# Patient Record
Sex: FEMALE | Race: OTHER | NOT HISPANIC OR LATINO | URBAN - METROPOLITAN AREA
[De-identification: names, ages, dates, MRNs, and addresses within clinical notes are randomized per-mention and may not be internally consistent; named-entity substitution may affect disease eponyms.]

---

## 2019-03-25 ENCOUNTER — INPATIENT (INPATIENT)
Facility: HOSPITAL | Age: 64
LOS: 1 days | Discharge: HOME | DRG: 392 | End: 2019-03-27
Attending: INTERNAL MEDICINE | Admitting: HOSPITALIST
Payer: MEDICARE

## 2019-03-25 VITALS
OXYGEN SATURATION: 99 % | SYSTOLIC BLOOD PRESSURE: 126 MMHG | HEIGHT: 64 IN | TEMPERATURE: 98 F | HEART RATE: 78 BPM | RESPIRATION RATE: 18 BRPM | WEIGHT: 220.02 LBS | DIASTOLIC BLOOD PRESSURE: 87 MMHG

## 2019-03-25 PROCEDURE — 99285 EMERGENCY DEPT VISIT HI MDM: CPT | Mod: 25

## 2019-03-25 PROCEDURE — 93010 ELECTROCARDIOGRAM REPORT: CPT

## 2019-03-26 DIAGNOSIS — R10.13 EPIGASTRIC PAIN: ICD-10-CM

## 2019-03-26 DIAGNOSIS — R94.31 ABNORMAL ELECTROCARDIOGRAM [ECG] [EKG]: ICD-10-CM

## 2019-03-26 DIAGNOSIS — J90 PLEURAL EFFUSION, NOT ELSEWHERE CLASSIFIED: ICD-10-CM

## 2019-03-26 DIAGNOSIS — I10 ESSENTIAL (PRIMARY) HYPERTENSION: ICD-10-CM

## 2019-03-26 LAB
ALBUMIN SERPL ELPH-MCNC: 4.3 G/DL — SIGNIFICANT CHANGE UP (ref 3.3–5)
ALP SERPL-CCNC: 96 U/L — SIGNIFICANT CHANGE UP (ref 40–120)
ALT FLD-CCNC: 43 U/L — SIGNIFICANT CHANGE UP (ref 10–45)
ANION GAP SERPL CALC-SCNC: 17 MMOL/L — SIGNIFICANT CHANGE UP (ref 5–17)
APPEARANCE UR: CLEAR — SIGNIFICANT CHANGE UP
APTT BLD: 29.3 SEC — SIGNIFICANT CHANGE UP (ref 27.5–36.3)
AST SERPL-CCNC: 37 U/L — SIGNIFICANT CHANGE UP (ref 10–40)
BACTERIA # UR AUTO: ABNORMAL
BILIRUB SERPL-MCNC: 1 MG/DL — SIGNIFICANT CHANGE UP (ref 0.2–1.2)
BILIRUB UR-MCNC: NEGATIVE — SIGNIFICANT CHANGE UP
BUN SERPL-MCNC: 10 MG/DL — SIGNIFICANT CHANGE UP (ref 7–23)
CALCIUM SERPL-MCNC: 10.6 MG/DL — HIGH (ref 8.4–10.5)
CHLORIDE SERPL-SCNC: 99 MMOL/L — SIGNIFICANT CHANGE UP (ref 96–108)
CO2 SERPL-SCNC: 22 MMOL/L — SIGNIFICANT CHANGE UP (ref 22–31)
COLOR SPEC: SIGNIFICANT CHANGE UP
CREAT SERPL-MCNC: 0.57 MG/DL — SIGNIFICANT CHANGE UP (ref 0.5–1.3)
DIFF PNL FLD: NEGATIVE — SIGNIFICANT CHANGE UP
EPI CELLS # UR: 1 /HPF — SIGNIFICANT CHANGE UP
GAS PNL BLDV: SIGNIFICANT CHANGE UP
GAS PNL BLDV: SIGNIFICANT CHANGE UP
GLUCOSE BLDC GLUCOMTR-MCNC: 157 MG/DL — HIGH (ref 70–99)
GLUCOSE BLDC GLUCOMTR-MCNC: 192 MG/DL — HIGH (ref 70–99)
GLUCOSE BLDC GLUCOMTR-MCNC: 210 MG/DL — HIGH (ref 70–99)
GLUCOSE SERPL-MCNC: 159 MG/DL — HIGH (ref 70–99)
GLUCOSE UR QL: NEGATIVE — SIGNIFICANT CHANGE UP
HCT VFR BLD CALC: 43.7 % — SIGNIFICANT CHANGE UP (ref 34.5–45)
HGB BLD-MCNC: 14.5 G/DL — SIGNIFICANT CHANGE UP (ref 11.5–15.5)
HYALINE CASTS # UR AUTO: 0 /LPF — SIGNIFICANT CHANGE UP (ref 0–2)
INR BLD: 1.04 RATIO — SIGNIFICANT CHANGE UP (ref 0.88–1.16)
INR BLD: 1.14 RATIO — SIGNIFICANT CHANGE UP (ref 0.88–1.16)
KETONES UR-MCNC: NEGATIVE — SIGNIFICANT CHANGE UP
LEUKOCYTE ESTERASE UR-ACNC: NEGATIVE — SIGNIFICANT CHANGE UP
LIDOCAIN IGE QN: 35 U/L — SIGNIFICANT CHANGE UP (ref 7–60)
MCHC RBC-ENTMCNC: 28.4 PG — SIGNIFICANT CHANGE UP (ref 27–34)
MCHC RBC-ENTMCNC: 33.1 GM/DL — SIGNIFICANT CHANGE UP (ref 32–36)
MCV RBC AUTO: 85.6 FL — SIGNIFICANT CHANGE UP (ref 80–100)
NITRITE UR-MCNC: POSITIVE
PH UR: 5 — SIGNIFICANT CHANGE UP (ref 5–8)
PLATELET # BLD AUTO: 215 K/UL — SIGNIFICANT CHANGE UP (ref 150–400)
POTASSIUM SERPL-MCNC: 4.9 MMOL/L — SIGNIFICANT CHANGE UP (ref 3.5–5.3)
POTASSIUM SERPL-SCNC: 4.9 MMOL/L — SIGNIFICANT CHANGE UP (ref 3.5–5.3)
PROT SERPL-MCNC: 7.6 G/DL — SIGNIFICANT CHANGE UP (ref 6–8.3)
PROT UR-MCNC: NEGATIVE — SIGNIFICANT CHANGE UP
PROTHROM AB SERPL-ACNC: 11.9 SEC — SIGNIFICANT CHANGE UP (ref 10–12.9)
PROTHROM AB SERPL-ACNC: 13 SEC — HIGH (ref 10–12.9)
RBC # BLD: 5.11 M/UL — SIGNIFICANT CHANGE UP (ref 3.8–5.2)
RBC # FLD: 12.5 % — SIGNIFICANT CHANGE UP (ref 10.3–14.5)
RBC CASTS # UR COMP ASSIST: 2 /HPF — SIGNIFICANT CHANGE UP (ref 0–4)
SODIUM SERPL-SCNC: 138 MMOL/L — SIGNIFICANT CHANGE UP (ref 135–145)
SP GR SPEC: 1 — LOW (ref 1.01–1.02)
TROPONIN T, HIGH SENSITIVITY RESULT: 23 NG/L — SIGNIFICANT CHANGE UP (ref 0–51)
TROPONIN T, HIGH SENSITIVITY RESULT: 23 NG/L — SIGNIFICANT CHANGE UP (ref 0–51)
UROBILINOGEN FLD QL: NEGATIVE — SIGNIFICANT CHANGE UP
WBC # BLD: 10.4 K/UL — SIGNIFICANT CHANGE UP (ref 3.8–10.5)
WBC # FLD AUTO: 10.4 K/UL — SIGNIFICANT CHANGE UP (ref 3.8–10.5)
WBC UR QL: 2 /HPF — SIGNIFICANT CHANGE UP (ref 0–5)

## 2019-03-26 PROCEDURE — 93306 TTE W/DOPPLER COMPLETE: CPT | Mod: 26

## 2019-03-26 PROCEDURE — 74177 CT ABD & PELVIS W/CONTRAST: CPT | Mod: 26

## 2019-03-26 PROCEDURE — 71046 X-RAY EXAM CHEST 2 VIEWS: CPT | Mod: 26

## 2019-03-26 RX ORDER — SODIUM CHLORIDE 9 MG/ML
1000 INJECTION, SOLUTION INTRAVENOUS
Qty: 0 | Refills: 0 | Status: DISCONTINUED | OUTPATIENT
Start: 2019-03-26 | End: 2019-03-27

## 2019-03-26 RX ORDER — SUCRALFATE 1 G
1 TABLET ORAL
Qty: 0 | Refills: 0 | Status: DISCONTINUED | OUTPATIENT
Start: 2019-03-26 | End: 2019-03-27

## 2019-03-26 RX ORDER — PANTOPRAZOLE SODIUM 20 MG/1
40 TABLET, DELAYED RELEASE ORAL
Qty: 0 | Refills: 0 | Status: DISCONTINUED | OUTPATIENT
Start: 2019-03-26 | End: 2019-03-26

## 2019-03-26 RX ORDER — HEPARIN SODIUM 5000 [USP'U]/ML
5000 INJECTION INTRAVENOUS; SUBCUTANEOUS EVERY 8 HOURS
Qty: 0 | Refills: 0 | Status: DISCONTINUED | OUTPATIENT
Start: 2019-03-26 | End: 2019-03-27

## 2019-03-26 RX ORDER — GLUCAGON INJECTION, SOLUTION 0.5 MG/.1ML
1 INJECTION, SOLUTION SUBCUTANEOUS ONCE
Qty: 0 | Refills: 0 | Status: DISCONTINUED | OUTPATIENT
Start: 2019-03-26 | End: 2019-03-27

## 2019-03-26 RX ORDER — ATENOLOL 25 MG/1
100 TABLET ORAL DAILY
Qty: 0 | Refills: 0 | Status: DISCONTINUED | OUTPATIENT
Start: 2019-03-26 | End: 2019-03-27

## 2019-03-26 RX ORDER — SITAGLIPTIN 50 MG/1
1 TABLET, FILM COATED ORAL
Qty: 0 | Refills: 0 | COMMUNITY

## 2019-03-26 RX ORDER — SODIUM CHLORIDE 9 MG/ML
1000 INJECTION, SOLUTION INTRAVENOUS ONCE
Qty: 0 | Refills: 0 | Status: COMPLETED | OUTPATIENT
Start: 2019-03-26 | End: 2019-03-26

## 2019-03-26 RX ORDER — ATORVASTATIN CALCIUM 80 MG/1
1 TABLET, FILM COATED ORAL
Qty: 0 | Refills: 0 | COMMUNITY

## 2019-03-26 RX ORDER — INSULIN LISPRO 100/ML
VIAL (ML) SUBCUTANEOUS
Qty: 0 | Refills: 0 | Status: DISCONTINUED | OUTPATIENT
Start: 2019-03-26 | End: 2019-03-27

## 2019-03-26 RX ORDER — PANTOPRAZOLE SODIUM 20 MG/1
40 TABLET, DELAYED RELEASE ORAL
Qty: 0 | Refills: 0 | Status: DISCONTINUED | OUTPATIENT
Start: 2019-03-26 | End: 2019-03-27

## 2019-03-26 RX ORDER — DEXTROSE 50 % IN WATER 50 %
12.5 SYRINGE (ML) INTRAVENOUS ONCE
Qty: 0 | Refills: 0 | Status: DISCONTINUED | OUTPATIENT
Start: 2019-03-26 | End: 2019-03-27

## 2019-03-26 RX ORDER — FAMOTIDINE 10 MG/ML
20 INJECTION INTRAVENOUS ONCE
Qty: 0 | Refills: 0 | Status: COMPLETED | OUTPATIENT
Start: 2019-03-26 | End: 2019-03-26

## 2019-03-26 RX ORDER — DEXTROSE 50 % IN WATER 50 %
15 SYRINGE (ML) INTRAVENOUS ONCE
Qty: 0 | Refills: 0 | Status: DISCONTINUED | OUTPATIENT
Start: 2019-03-26 | End: 2019-03-27

## 2019-03-26 RX ORDER — DEXTROSE 50 % IN WATER 50 %
25 SYRINGE (ML) INTRAVENOUS ONCE
Qty: 0 | Refills: 0 | Status: DISCONTINUED | OUTPATIENT
Start: 2019-03-26 | End: 2019-03-27

## 2019-03-26 RX ORDER — METFORMIN HYDROCHLORIDE 850 MG/1
1 TABLET ORAL
Qty: 0 | Refills: 0 | COMMUNITY

## 2019-03-26 RX ORDER — ATENOLOL 25 MG/1
1 TABLET ORAL
Qty: 0 | Refills: 0 | COMMUNITY

## 2019-03-26 RX ORDER — CHOLECALCIFEROL (VITAMIN D3) 125 MCG
1 CAPSULE ORAL
Qty: 0 | Refills: 0 | COMMUNITY

## 2019-03-26 RX ORDER — ATORVASTATIN CALCIUM 80 MG/1
10 TABLET, FILM COATED ORAL AT BEDTIME
Qty: 0 | Refills: 0 | Status: DISCONTINUED | OUTPATIENT
Start: 2019-03-26 | End: 2019-03-27

## 2019-03-26 RX ADMIN — FAMOTIDINE 20 MILLIGRAM(S): 10 INJECTION INTRAVENOUS at 04:27

## 2019-03-26 RX ADMIN — ATENOLOL 100 MILLIGRAM(S): 25 TABLET ORAL at 21:50

## 2019-03-26 RX ADMIN — Medication 4: at 17:11

## 2019-03-26 RX ADMIN — Medication 1 GRAM(S): at 23:27

## 2019-03-26 RX ADMIN — HEPARIN SODIUM 5000 UNIT(S): 5000 INJECTION INTRAVENOUS; SUBCUTANEOUS at 21:49

## 2019-03-26 RX ADMIN — ATORVASTATIN CALCIUM 10 MILLIGRAM(S): 80 TABLET, FILM COATED ORAL at 21:49

## 2019-03-26 RX ADMIN — PANTOPRAZOLE SODIUM 40 MILLIGRAM(S): 20 TABLET, DELAYED RELEASE ORAL at 17:11

## 2019-03-26 RX ADMIN — Medication 1 GRAM(S): at 17:11

## 2019-03-26 RX ADMIN — SODIUM CHLORIDE 3000 MILLILITER(S): 9 INJECTION, SOLUTION INTRAVENOUS at 06:01

## 2019-03-26 RX ADMIN — Medication 30 MILLILITER(S): at 04:27

## 2019-03-26 NOTE — ED PROVIDER NOTE - CLINICAL SUMMARY MEDICAL DECISION MAKING FREE TEXT BOX
Resident: 63y F with HTN, HLD, DM presents with epigastric/chest/back pain with nausea, vomiting, shortness of breath. vitals wnl. non-focal exam. Concern for acs, pneumonia, gerd, will obtain ekg, cxr, labs, trop x 2, reassess. see attending note / orders for clinical course / interpretations          *pt speaks farsi, son easily translating, declined additional services

## 2019-03-26 NOTE — CONSULT NOTE ADULT - SUBJECTIVE AND OBJECTIVE BOX
Chief Complaint:  Patient is a 63y old  Female who presents with a chief complaint of epigastric pain (26 Mar 2019 13:07)      HPI: 63y F PMH HTN, HLD, DM presents with epigastric pain x 3 days. Patient reports pain started as intermittent back pain, which then became epigastric and chest pain, moderate severity, non-radiating, worse when laying flat, associated with shortness of breath, nausea, vomiting NBNB x 1 episode. Has never had this pain before, though does take Omeprazole for her stomach. At time of examination, epigastric pain has resolved. Pt denies abdominal pain, nausea, vomiting, diarrhea, constipation, brbpr/melena. Hx of colonoscopy/endoscopy, use of NSAIDS, etoh, tobacco.       Allergies:  No Known Allergies      Medications:  ATENolol  Tablet 100 milliGRAM(s) Oral daily  atorvastatin 10 milliGRAM(s) Oral at bedtime  dextrose 40% Gel 15 Gram(s) Oral once PRN  dextrose 5%. 1000 milliLiter(s) IV Continuous <Continuous>  dextrose 50% Injectable 12.5 Gram(s) IV Push once  dextrose 50% Injectable 25 Gram(s) IV Push once  dextrose 50% Injectable 25 Gram(s) IV Push once  glucagon  Injectable 1 milliGRAM(s) IntraMuscular once PRN  heparin  Injectable 5000 Unit(s) SubCutaneous every 8 hours  insulin lispro (HumaLOG) corrective regimen sliding scale   SubCutaneous three times a day before meals  pantoprazole    Tablet 40 milliGRAM(s) Oral before breakfast      PMHX/PSHX:  DM (diabetes mellitus)  High cholesterol  HTN (hypertension)  No significant past surgical history      Family history:  No pertinent family history in first degree relatives      Social History: non- toxic habits     ROS:     General:  No wt loss, fevers, chills, night sweats, fatigue,   Eyes:  Good vision, no reported pain  ENT:  No sore throat, pain, runny nose, dysphagia  CV:  No pain, palpitations, hypo/hypertension  Resp:  No dyspnea, cough, tachypnea, wheezing  GI:  see HPI  :  No pain, bleeding, incontinence, nocturia  Muscle:  No pain, weakness  Neuro:  No weakness, tingling, memory problems  Psych:  No fatigue, insomnia, mood problems, depression  Endocrine:  No polyuria, polydipsia, cold/heat intolerance  Heme:  No petechiae, ecchymosis, easy bruisability  Skin:  No rash, tattoos, scars, edema      PHYSICAL EXAM:   Vital Signs:  Vital Signs Last 24 Hrs  T(C): 36.9 (26 Mar 2019 10:20), Max: 37.1 (26 Mar 2019 07:10)  T(F): 98.4 (26 Mar 2019 10:20), Max: 98.7 (26 Mar 2019 07:10)  HR: 78 (26 Mar 2019 10:20) (72 - 78)  BP: 155/85 (26 Mar 2019 10:20) (120/79 - 155/85)  BP(mean): --  RR: 18 (26 Mar 2019 10:20) (16 - 18)  SpO2: 95% (26 Mar 2019 10:20) (95% - 99%)  Daily Height in cm: 162.56 (25 Mar 2019 22:46)    Daily     GENERAL:  Appears stated age, well-groomed, well-nourished, no distress  HEENT:  NC/AT,  conjunctivae clear and pink, no thyromegaly, nodules, adenopathy, no JVD, sclera -anicteric  CHEST:  Full & symmetric excursion, no increased effort, breath sounds clear  HEART:  Regular rhythm, S1, S2, no murmur/rub/S3/S4, no abdominal bruit, no edema  ABDOMEN:  Soft, non-tender, obese, non-distended, normoactive bowel sounds,  no masses ,no hepato-splenomegaly, no signs of chronic liver disease  EXTEREMITIES:  no cyanosis,clubbing or edema  SKIN:  No rash/erythema/ecchymoses/petechiae/wounds/abscess/warm/dry  NEURO:  Alert, oriented, no asterixis, no tremor, no encephalopathy    LABS:                        14.5   10.4  )-----------( 215      ( 26 Mar 2019 05:00 )             43.7         138  |  99  |  10  ----------------------------<  159<H>  4.9   |  22  |  0.57    Ca    10.6<H>      26 Mar 2019 05:00    TPro  7.6  /  Alb  4.3  /  TBili  1.0  /  DBili  x   /  AST  37  /  ALT  43  /  AlkPhos  96      LIVER FUNCTIONS - ( 26 Mar 2019 05:00 )  Alb: 4.3 g/dL / Pro: 7.6 g/dL / ALK PHOS: 96 U/L / ALT: 43 U/L / AST: 37 U/L / GGT: x           PT/INR - ( 26 Mar 2019 07:06 )   PT: 13.0 sec;   INR: 1.14 ratio         PTT - ( 26 Mar 2019 07:06 )  PTT:29.3 sec  Urinalysis Basic - ( 26 Mar 2019 07:06 )    Color: Light Yellow / Appearance: Clear / S.005 / pH: x  Gluc: x / Ketone: Negative  / Bili: Negative / Urobili: Negative   Blood: x / Protein: Negative / Nitrite: Positive   Leuk Esterase: Negative / RBC: 2 /hpf / WBC 2 /HPF   Sq Epi: x / Non Sq Epi: 1 /hpf / Bacteria: Many      Amylase Serum--      Lipase serum35       Ammonia--      Imaging:

## 2019-03-26 NOTE — H&P ADULT - NSHPREVIEWOFSYSTEMS_GEN_ALL_CORE
Constitutional: No fever, fatigue or weight loss.  Skin: No rash.  Eyes: No recent vision problems or eye pain.  ENT: No congestion, ear pain, or sore throat.  Endocrine: No thyroid problems.  Cardiovascular: No chest pain or palpation.  Respiratory: No cough, shortness of breath, congestion, or wheezing.  Gastrointestinal: as per hpi  Genitourinary: No dysuria.  Musculoskeletal: No joint swelling.  Neurologic: No headache.

## 2019-03-26 NOTE — ED PROVIDER NOTE - NS ED ROS FT
Constitutional: no fever, no chills.  Eyes: no visual changes.  ENMT: no sore throat.  CV: +chest pain.  Resp: no cough, +shortness of breath.  GI: no abdominal pain, +nausea, +vomiting, no diarrhea.  : no dysuria, no hematuria.  MSK: +back pain, no neck pain.  Skin: no rashes.  Neuro: no headache, no loss of consciousness, no weakness, no numbness, no tingling.  Psych: no known mental health issues.  Endo: +diabetes, no thyroid trouble.

## 2019-03-26 NOTE — ED PROVIDER NOTE - PHYSICAL EXAMINATION
Resident:   Gen: well appearing, of stated age, no acute distress  Head: NC, AT  ENT: PERRL, MMM  Neck: supple with full ROM   Chest: CTAB, no retractions, rate normal, appears to breathe comfortably  Heart: RRR S1S2, No peripheral edema, bilateral pulses in arms and legs  Abd: Soft non-tender, no rebound or guarding  Back: No spinal deformity, no CVAT  Ext: Moving all 4 extremities without obvious impairment to ROM, no obvious weakness  Neuro: fluid speech  Psych: No anxiety, depression or pressured speech noted  Skin: no urticaria, no diffuse rash

## 2019-03-26 NOTE — H&P ADULT - ASSESSMENT
62 yo female h/o htn, chol, dm, a/w atypical chest pain, epigastric abd pain, n/v    chest pain  acs ruled out  cards consulted  check echo    abd pain with nausea/vomiting  check ct abd/pel  GI consulted    dm  iss   monitor fs    +UA  no symptoms   watch off abx  id f/u    cont other home meds    dvt ppx

## 2019-03-26 NOTE — ED PROVIDER NOTE - ATTENDING CONTRIBUTION TO CARE
------------ATTENDING NOTE------------   pt w/ son c/o epigastric mild discomfort and slight nausea, describes some vague chest discomfort as well, CAD likely by risk factors, HD stable, clear chest w/o distress, nml cardiac sounds, equal distal pulses, soft benign abd w/o mass/tenderness, no urinary complaints, tolerating PO, initial labs/imaging wnl, admission for continued cardiac evaluation/monitoring and close reassessments.  - Alex Sepulveda MD   ----------------------------------------------

## 2019-03-26 NOTE — H&P ADULT - NSHPPHYSICALEXAM_GEN_ALL_CORE
Vital Signs Last 24 Hrs  T(C): 36.9 (26 Mar 2019 10:20), Max: 37.1 (26 Mar 2019 07:10)  T(F): 98.4 (26 Mar 2019 10:20), Max: 98.7 (26 Mar 2019 07:10)  HR: 78 (26 Mar 2019 10:20) (72 - 78)  BP: 155/85 (26 Mar 2019 10:20) (120/79 - 155/85)  BP(mean): --  RR: 18 (26 Mar 2019 10:20) (16 - 18)  SpO2: 95% (26 Mar 2019 10:20) (95% - 99%)    PHYSICAL EXAM:  GENERAL: NAD, well-developed  HEAD:  Atraumatic, Normocephalic  EYES: EOMI, PERRLA, conjunctiva and sclera clear  NECK: Supple, No JVD  CHEST/LUNG: Clear to auscultation bilaterally; No wheeze  HEART: Regular rate and rhythm; No murmurs, rubs, or gallops  ABDOMEN: Soft, Nontender, Nondistended; Bowel sounds present  EXTREMITIES:  2+ Peripheral Pulses, No clubbing, cyanosis, or edema  PSYCH: AAOx3  NEUROLOGY: non-focal  SKIN: No rashes or lesions

## 2019-03-26 NOTE — ED ADULT NURSE REASSESSMENT NOTE - NS ED NURSE REASSESS COMMENT FT1
0700 Report received from night nurse. TBA tele. No bed yet. Son at [facility]. A&Ox4. No c/o pain. Pt speaks Farsi. IVL intact without sx of infilt. Color pink. skin W&D

## 2019-03-26 NOTE — CONSULT NOTE ADULT - PROBLEM SELECTOR RECOMMENDATION 9
- CT abd/pelvis ordered, will follow up  - start PPI oral BID, Carafate 1 gram QID  - diet as tolerated  - keep NPO after midnight for possible upper gastrointestinal endoscopy in am to evaluate for PUD, however pt currently not interested in procedure   - elevate HOB 30 degrees after meals  - avoid acidic foods  - anti emetics prn

## 2019-03-26 NOTE — H&P ADULT - HISTORY OF PRESENT ILLNESS
63y F PMH HTN, HLD, DM presents with epigastic pain x 3 days. Patient reports pain started as intermittent back pain, which then became epigastric and chest pain, moderate severity, non-radiating, worse when laying flat, associated with shortness of breath, nausea, vomiting x 1 episode. Has never had this pain before, thought does take a medication for her stomach that she cannot recal the name of, has never had an endoscopy.

## 2019-03-26 NOTE — ED PROVIDER NOTE - CARE PLAN
Principal Discharge DX:	Dyspepsia Principal Discharge DX:	Epigastric pain Principal Discharge DX:	Undifferentiated abdominal pain  Secondary Diagnosis:	Dyspepsia  Secondary Diagnosis:	Chest pain of uncertain etiology

## 2019-03-26 NOTE — ED ADULT NURSE REASSESSMENT NOTE - NS ED NURSE REASSESS COMMENT FT1
received report on pt. pt denies abd pain/cp/n/v/palpitations/fever/chills. pt awaiting bed placement. pt and family aware of plan of care. no distress.

## 2019-03-26 NOTE — ED PROVIDER NOTE - OBJECTIVE STATEMENT
Resident: son at bedside translating to milka at patient request. 63y F PMH HTN, HLD, DM presents with epigastic pain x 3 days. Patient reports pain started as intermittent back pain, which then became epigastric and chest pain, moderate severity, non-radiating, worse when laying flat, associated with shortness of breath, nausea, vomiting x 1 episode. Has never had this pain before, thought does take a medication for her stomach that she cannot recal the name of, has never had an endoscopy.

## 2019-03-26 NOTE — ED PROVIDER NOTE - ADDITIONAL RISK FACTOR FREE TEXT BOX
Resident: 63y F with HTN, HLD, DM presents with epigastric/chest/back pain with nausea, vomiting, shortness of breath. vitals wnl. non-focal exam. Concern for acs, pneumonia, gerd, will obtain ekg, cxr, labs, trop x 2, reassess.

## 2019-03-26 NOTE — ED PROVIDER NOTE - NS_EDPROVIDERDISPOUSERTYPE_ED_A_ED
DTC follow up Note    Recommendations/ Comments: Chart reviewed for follow up for hospital glucose management. Pt with blood sugars ranging 210-278 mg/dl. She has received 28 units of correction insulin in the past 24 hours. A1c of 10.1 indicative of poor glucose control, may be new dx? If appropriate, please consider:  Adding Lantus 20 units this morning for elevated blood sugars  Recommendation discussed with Rama Abdul NP    Current hospital DM medication: Humalog for correction, normal sensitivity scale     Chart reviewed on [address] [facility]. Patient is a 42 y.o. female with no hx of diabetes noted, ? 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 Active Orders   Diet    DIET GI LITE (POST SURGICAL)        PO intake: Patient Vitals for the past 72 hrs:   % Diet Eaten   08/01/18 1026 75 %   07/31/18 1918 75 %       Will continue to follow as needed.     Thank you  Albert Briceno RD, [address] [facility]  Pager: [phone] Attending Attestation (For Attendings USE Only)...

## 2019-03-26 NOTE — ED ADULT NURSE REASSESSMENT NOTE - NS ED NURSE REASSESS COMMENT FT1
2180 Pt eating breakfast. No c/o pain. voiding well. Awaiting tele bed. No beds available at this time. Pt son states he wants to leave and take his mother to another facility since there is no room available.  notified and will speak to pt and pt's son.

## 2019-03-26 NOTE — CONSULT NOTE ADULT - SUBJECTIVE AND OBJECTIVE BOX
CHIEF COMPLAINT: Abdominal pain     HISTORY OF PRESENT ILLNESS:   63y F PMH HTN, HLD, DM presents with epigastic pain x 3 days. Patient reports pain started as intermittent back pain, which then became epigastric and chest pain, moderate severity, non-radiating, worse when laying flat, associated with shortness of breath, nausea, vomiting x 1 episode. Has never had this pain before.    Also found to have an abnormal EKG with anteroseptal infarct pattern. CXR shows bilateral pleural effusions. Denies chest pain, shortness of breath or palpitations,. No previous cardiac evaluation or workup.       PAST MEDICAL & SURGICAL HISTORY:      reviewed     MEDICATIONS:    reviewed               FAMILY HISTORY:      SOCIAL HISTORY:    [ ] Non-smoker  [ ] Smoker  [ ] Alcohol    Allergies    No Known Allergies    Intolerances    	    REVIEW OF SYSTEMS:  CONSTITUTIONAL: No fever, weight loss, or fatigue  EYES: No eye pain, visual disturbances, or discharge  ENMT:  No difficulty hearing, tinnitus, vertigo; No sinus or throat pain  NECK: No pain or stiffness  RESPIRATORY: No cough, wheezing, chills or hemoptysis; No Shortness of Breath  CARDIOVASCULAR: No chest pain, palpitations, passing out, dizziness, or leg swelling  GASTROINTESTINAL: +abdominal or epigastric pain. +nausea, vomiting, or hematemesis; No diarrhea or constipation. No melena or hematochezia.  GENITOURINARY: No dysuria, frequency, hematuria, or incontinence  NEUROLOGICAL: No headaches, memory loss, loss of strength, numbness, or tremors  SKIN: No itching, burning, rashes, or lesions   LYMPH Nodes: No enlarged glands  ENDOCRINE: No heat or cold intolerance; No hair loss  MUSCULOSKELETAL: + joint pain or swelling; No muscle, back, or extremity pain  PSYCHIATRIC: No depression, anxiety, mood swings, or difficulty sleeping  HEME/LYMPH: No easy bruising, or bleeding gums  ALLERY AND IMMUNOLOGIC: No hives or eczema	    [ ] All others negative	  [ ] Unable to obtain    PHYSICAL EXAM:  T(C): 36.9 (03-26-19 @ 10:20), Max: 37.1 (03-26-19 @ 07:10)  HR: 78 (03-26-19 @ 10:20) (72 - 78)  BP: 155/85 (03-26-19 @ 10:20) (120/79 - 155/85)  RR: 18 (03-26-19 @ 10:20) (16 - 18)  SpO2: 95% (03-26-19 @ 10:20) (95% - 99%)  Wt(kg): --  I&O's Summary      Appearance: NAD  HEENT:   Normal oral mucosa, PERRL, EOMI	  Lymphatic: No lymphadenopathy  Cardiovascular: Normal S1 S2, No JVD, No murmurs, No edema  Respiratory: Decreased bs   Psychiatry: A & O x 3, Mood & affect appropriate  Gastrointestinal:  Soft, Non-tender, + BS	  Skin: No rashes, No ecchymoses, No cyanosis	  Neurologic: Non-focal  Extremities: Normal range of motion, No clubbing, cyanosis or edema  Vascular: Peripheral pulses palpable 2+ bilaterally    TELEMETRY: 	    ECG:  	NSR, anteroseptal infarct, no acute ischemic stt changes   RADIOLOGY:  < from: Xray Chest 2 Views PA/Lat (03.26.19 @ 04:03) >    EXAM:  XR CHEST PA LAT 2V                            PROCEDURE DATE:  03/26/2019            INTERPRETATION:  CLINICAL INDICATION: Chest Pain.    EXAM: Frontal and lateral views of the chest with no prior radiographs.    FINDINGS:   The heart size is enlarged.  The lungs are clear. Trace bilateral pleural effusions.  Degenerative changes of the thoracic spine.    IMPRESSION:   Trace bilateral pleural effusions.    Cardiomegaly.                DENISE CONLEY M.D., RADIOLOGY RESIDENT  This document has been electronically signed.  MAIRA DE LA ROSA M.D., ATTENDING RADIOLOGIST  This document has been electronically signed. Mar 26 2019  9:37AM                < end of copied text >    OTHER: 	  	  LABS:	 	    CARDIAC MARKERS:                                  14.5   10.4  )-----------( 215      ( 26 Mar 2019 05:00 )             43.7     03-26    138  |  99  |  10  ----------------------------<  159<H>  4.9   |  22  |  0.57    Ca    10.6<H>      26 Mar 2019 05:00    TPro  7.6  /  Alb  4.3  /  TBili  1.0  /  DBili  x   /  AST  37  /  ALT  43  /  AlkPhos  96  03-26    proBNP:   Lipid Profile:   HgA1c:   TSH:

## 2019-03-26 NOTE — CONSULT NOTE ADULT - PROBLEM SELECTOR RECOMMENDATION 9
Check Trops   currently no chest pain or shortness of breath   Outpatient SPECT/Ischemic workup  ASA

## 2019-03-27 ENCOUNTER — TRANSCRIPTION ENCOUNTER (OUTPATIENT)
Age: 64
End: 2019-03-27

## 2019-03-27 VITALS
RESPIRATION RATE: 918 BRPM | OXYGEN SATURATION: 94 % | DIASTOLIC BLOOD PRESSURE: 88 MMHG | TEMPERATURE: 98 F | SYSTOLIC BLOOD PRESSURE: 136 MMHG | HEART RATE: 68 BPM

## 2019-03-27 DIAGNOSIS — Z71.89 OTHER SPECIFIED COUNSELING: ICD-10-CM

## 2019-03-27 LAB
ANION GAP SERPL CALC-SCNC: 14 MMOL/L — SIGNIFICANT CHANGE UP (ref 5–17)
BUN SERPL-MCNC: 10 MG/DL — SIGNIFICANT CHANGE UP (ref 7–23)
CALCIUM SERPL-MCNC: 9.4 MG/DL — SIGNIFICANT CHANGE UP (ref 8.4–10.5)
CHLORIDE SERPL-SCNC: 101 MMOL/L — SIGNIFICANT CHANGE UP (ref 96–108)
CO2 SERPL-SCNC: 25 MMOL/L — SIGNIFICANT CHANGE UP (ref 22–31)
CREAT SERPL-MCNC: 0.67 MG/DL — SIGNIFICANT CHANGE UP (ref 0.5–1.3)
GLUCOSE BLDC GLUCOMTR-MCNC: 181 MG/DL — HIGH (ref 70–99)
GLUCOSE BLDC GLUCOMTR-MCNC: 280 MG/DL — HIGH (ref 70–99)
GLUCOSE SERPL-MCNC: 164 MG/DL — HIGH (ref 70–99)
HBA1C BLD-MCNC: 8.8 % — HIGH (ref 4–5.6)
HCT VFR BLD CALC: 38.4 % — SIGNIFICANT CHANGE UP (ref 34.5–45)
HGB BLD-MCNC: 13.6 G/DL — SIGNIFICANT CHANGE UP (ref 11.5–15.5)
MCHC RBC-ENTMCNC: 30.7 PG — SIGNIFICANT CHANGE UP (ref 27–34)
MCHC RBC-ENTMCNC: 35.3 GM/DL — SIGNIFICANT CHANGE UP (ref 32–36)
MCV RBC AUTO: 87.1 FL — SIGNIFICANT CHANGE UP (ref 80–100)
PLATELET # BLD AUTO: 200 K/UL — SIGNIFICANT CHANGE UP (ref 150–400)
POTASSIUM SERPL-MCNC: 4 MMOL/L — SIGNIFICANT CHANGE UP (ref 3.5–5.3)
POTASSIUM SERPL-SCNC: 4 MMOL/L — SIGNIFICANT CHANGE UP (ref 3.5–5.3)
RBC # BLD: 4.41 M/UL — SIGNIFICANT CHANGE UP (ref 3.8–5.2)
RBC # FLD: 12.4 % — SIGNIFICANT CHANGE UP (ref 10.3–14.5)
SODIUM SERPL-SCNC: 140 MMOL/L — SIGNIFICANT CHANGE UP (ref 135–145)
WBC # BLD: 7.8 K/UL — SIGNIFICANT CHANGE UP (ref 3.8–10.5)
WBC # FLD AUTO: 7.8 K/UL — SIGNIFICANT CHANGE UP (ref 3.8–10.5)

## 2019-03-27 PROCEDURE — 85027 COMPLETE CBC AUTOMATED: CPT

## 2019-03-27 PROCEDURE — 85610 PROTHROMBIN TIME: CPT

## 2019-03-27 PROCEDURE — 82803 BLOOD GASES ANY COMBINATION: CPT

## 2019-03-27 PROCEDURE — 85730 THROMBOPLASTIN TIME PARTIAL: CPT

## 2019-03-27 PROCEDURE — 84132 ASSAY OF SERUM POTASSIUM: CPT

## 2019-03-27 PROCEDURE — 93306 TTE W/DOPPLER COMPLETE: CPT

## 2019-03-27 PROCEDURE — 99285 EMERGENCY DEPT VISIT HI MDM: CPT | Mod: 25

## 2019-03-27 PROCEDURE — 84484 ASSAY OF TROPONIN QUANT: CPT

## 2019-03-27 PROCEDURE — 82947 ASSAY GLUCOSE BLOOD QUANT: CPT

## 2019-03-27 PROCEDURE — 82435 ASSAY OF BLOOD CHLORIDE: CPT

## 2019-03-27 PROCEDURE — 71046 X-RAY EXAM CHEST 2 VIEWS: CPT

## 2019-03-27 PROCEDURE — 81001 URINALYSIS AUTO W/SCOPE: CPT

## 2019-03-27 PROCEDURE — 96374 THER/PROPH/DIAG INJ IV PUSH: CPT

## 2019-03-27 PROCEDURE — 93005 ELECTROCARDIOGRAM TRACING: CPT

## 2019-03-27 PROCEDURE — 83036 HEMOGLOBIN GLYCOSYLATED A1C: CPT

## 2019-03-27 PROCEDURE — 82962 GLUCOSE BLOOD TEST: CPT

## 2019-03-27 PROCEDURE — 83690 ASSAY OF LIPASE: CPT

## 2019-03-27 PROCEDURE — 74177 CT ABD & PELVIS W/CONTRAST: CPT

## 2019-03-27 PROCEDURE — 84295 ASSAY OF SERUM SODIUM: CPT

## 2019-03-27 PROCEDURE — 85014 HEMATOCRIT: CPT

## 2019-03-27 PROCEDURE — 83605 ASSAY OF LACTIC ACID: CPT

## 2019-03-27 PROCEDURE — 82330 ASSAY OF CALCIUM: CPT

## 2019-03-27 PROCEDURE — 80053 COMPREHEN METABOLIC PANEL: CPT

## 2019-03-27 PROCEDURE — 80048 BASIC METABOLIC PNL TOTAL CA: CPT

## 2019-03-27 RX ORDER — OMEPRAZOLE 10 MG/1
1 CAPSULE, DELAYED RELEASE ORAL
Qty: 0 | Refills: 0 | COMMUNITY

## 2019-03-27 RX ORDER — PANTOPRAZOLE SODIUM 20 MG/1
1 TABLET, DELAYED RELEASE ORAL
Qty: 60 | Refills: 0 | OUTPATIENT
Start: 2019-03-27 | End: 2019-04-25

## 2019-03-27 RX ADMIN — PANTOPRAZOLE SODIUM 40 MILLIGRAM(S): 20 TABLET, DELAYED RELEASE ORAL at 06:07

## 2019-03-27 RX ADMIN — Medication 6: at 12:48

## 2019-03-27 RX ADMIN — Medication 1 GRAM(S): at 06:06

## 2019-03-27 RX ADMIN — HEPARIN SODIUM 5000 UNIT(S): 5000 INJECTION INTRAVENOUS; SUBCUTANEOUS at 06:07

## 2019-03-27 RX ADMIN — ATENOLOL 100 MILLIGRAM(S): 25 TABLET ORAL at 06:06

## 2019-03-27 RX ADMIN — Medication 1 GRAM(S): at 12:38

## 2019-03-27 NOTE — PROGRESS NOTE ADULT - PROBLEM SELECTOR PLAN 1
- CT abd/pelvis reviewed with no acute pathology   - ppi daily on discharge   - diet as tolerated  - keep NPO after midnight for possible upper gastrointestinal endoscopy in am to evaluate for PUD, however pt currently not interested in procedure   - elevate HOB 30 degrees after meals  - avoid acidic foods  - anti emetics prn  - dc planning

## 2019-03-27 NOTE — DISCHARGE NOTE PROVIDER - HOSPITAL COURSE
63y F PMH HTN, HLD, DM presents with epigastic pain x 3 days. Patient reports pain started as intermittent back pain, which then became epigastric and chest pain, moderate severity, non-radiating, worse when laying flat, associated with shortness of breath, nausea, vomiting x 1 episode. Has never had this pain before, thought does take a medication for her stomach that she cannot recal the name of, has never had an endoscopy. CT abd/pelvis ordered- with no acute pathology. Start PPI oral BID, Carafate 1 gram QID. PT needs upper gastrointestinal endoscopy to evaluate for PUD, however pt currently not interested in procedure. ppi daily on discharge. PT is able to tolerate foods without any pain. Pt was seen by cardiology. Abnormal electrocardiogram. ACS ruled out. Trops negative, Echo normal. Currently no chest pain or shortness of breath. Outpatient SPECT/Ischemic workup. Pt is stable and cleared for discharge by medical attending. Follow up closely with out patient cardiologist for stress test and continued cardiac work up.

## 2019-03-27 NOTE — CONSULT NOTE ADULT - ASSESSMENT
62 yo female admitted with abdominal pain and found to have an abnormal EKG and bilateral pleural effusions.
63 year old female presents with epigastric pain and vomiting. GI consulted.
63y F PMH HTN, HLD, DM presents with epigastic pain x 3 days. Patient reports pain started as intermittent back pain, which then became epigastric and chest pain, moderate severity, non-radiating, worse when laying flat, associated with shortness of breath, nausea, vomiting x 1 episode. Has never had this pain before, thought does take a medication for her stomach that she cannot recal the name of, has never had an endoscopy. (26 Mar 2019 13:07)    ER vss.  Pt has been afebrile, no leukocytosis.  UA (+) nit/(-) LE, WBC - 2.  PT a/w chest pain, followed by cardiology and r/o for ACS.  CT abd/pelvis without bowel obstruction.  Pt currently being monitored off abx.  No urinary sx.  Denies burning, hematuria, flank pain, f/c/r.      ID consult called for further abx managment.        Recommend:    - Agree with monitoring off abx.  Pt without urinary symptoms.  No fever or leukocystosis.      - f/u with GI, pt declining EGD, states symptoms of epigastric pain/N/V have now resolved.  No diarrhea or abd pain.  Pt afebrile.  Tolerating regular diet a per son.        No further ID w/u.    Nancy Fieldi  [phone]

## 2019-03-27 NOTE — DISCHARGE NOTE PROVIDER - NSDCCPCAREPLAN_GEN_ALL_CORE_FT
PRINCIPAL DISCHARGE DIAGNOSIS  Diagnosis: Epigastric pain  Assessment and Plan of Treatment: CT abd/pelvis reviewed with no acute pathology.  Cotinue daily proton pump inhibitor on discharge to help with epigastric pain. Patient recommended for possible upper gastrointestinal endoscopy to evaluate for PUD, however pt currently not interested in procedure         SECONDARY DISCHARGE DIAGNOSES  Diagnosis: Abnormal electrocardiogram  Assessment and Plan of Treatment: Echocardiogram completed with EF of 60-65%. Currently no chest pain or shortness of breath. Outpatient SPECT/Ischemic workup. ASA.      Diagnosis: HTN (hypertension)  Assessment and Plan of Treatment: Follow up with your medical doctor to establish long term blood pressure treatment goals.

## 2019-03-27 NOTE — DISCHARGE NOTE PROVIDER - PROVIDER TOKENS
PROVIDER:[TOKEN:[[id number]],FOLLOWUP:[2 weeks]],PROVIDER:[TOKEN:[[id number]],FOLLOWUP:[1 week]]

## 2019-03-27 NOTE — PROGRESS NOTE ADULT - SUBJECTIVE AND OBJECTIVE BOX
Interval Events: pt seen and examined. No acute overnight events  tolerating PO well, pt denies n/v  epigastric pain has resolved    MEDICATIONS  (STANDING):  ATENolol  Tablet 100 milliGRAM(s) Oral daily  atorvastatin 10 milliGRAM(s) Oral at bedtime  dextrose 5%. 1000 milliLiter(s) (50 mL/Hr) IV Continuous <Continuous>  dextrose 50% Injectable 12.5 Gram(s) IV Push once  dextrose 50% Injectable 25 Gram(s) IV Push once  dextrose 50% Injectable 25 Gram(s) IV Push once  heparin  Injectable 5000 Unit(s) SubCutaneous every 8 hours  insulin lispro (HumaLOG) corrective regimen sliding scale   SubCutaneous three times a day before meals  pantoprazole    Tablet 40 milliGRAM(s) Oral two times a day  sucralfate 1 Gram(s) Oral four times a day    MEDICATIONS  (PRN):  dextrose 40% Gel 15 Gram(s) Oral once PRN Blood Glucose LESS THAN 70 milliGRAM(s)/deciliter  glucagon  Injectable 1 milliGRAM(s) IntraMuscular once PRN Glucose LESS THAN 70 milligrams/deciliter      Allergies    No Known Allergies    Intolerances        Review of Systems:    General:  No wt loss, fevers, chills, night sweats,fatigue,   Eyes:  Good vision, no reported pain  ENT:  No sore throat, pain, runny nose, dysphagia  CV:  No pain, palpitations, hypo/hypertension  Resp:  No dyspnea, cough, tachypnea, wheezing  GI:  No pain, No nausea, No vomiting, No diarrhea, No constipation, No weight loss, No fever, No pruritis, No rectal bleeding, No melena, No dysphagia  :  No pain, bleeding, incontinence, nocturia  Muscle:  No pain, weakness  Neuro:  No weakness, tingling, memory problems  Psych:  No fatigue, insomnia, mood problems, depression  Endocrine:  No polyuria, polydypsia, cold/heat intolerance  Heme:  No petechiae, ecchymosis, easy bruisability  Skin:  No rash, tattoos, scars, edema      Vital Signs Last 24 Hrs  T(C): 36.8 (27 Mar 2019 04:22), Max: 36.8 (26 Mar 2019 20:47)  T(F): 98.2 (27 Mar 2019 04:22), Max: 98.2 (26 Mar 2019 20:47)  HR: 81 (27 Mar 2019 04:22) (77 - 85)  BP: 137/85 (27 Mar 2019 04:22) (131/74 - 145/84)  BP(mean): --  RR: 18 (27 Mar 2019 04:22) (17 - 19)  SpO2: 93% (27 Mar 2019 04:22) (93% - 96%)    PHYSICAL EXAM:    Constitutional: NAD, well-developed  HEENT: EOMI, throat clear  Neck: No LAD, supple  Respiratory: CTA and P  Cardiovascular: S1 and S2, RRR, no M  Gastrointestinal: BS+, soft, NT/ND, neg HSM,  Extremities: No peripheral edema, neg clubing, cyanosis  Vascular: 2+ peripheral pulses  Neurological: A/O x 3, no focal deficits  Psychiatric: Normal mood, normal affect  Skin: No rashes      LABS:                        13.6   7.8   )-----------( 200      ( 27 Mar 2019 06:09 )             38.4     -    140  |  101  |  10  ----------------------------<  164<H>  4.0   |  25  |  0.67    Ca    9.4      27 Mar 2019 06:09    TPro  7.6  /  Alb  4.3  /  TBili  1.0  /  DBili  x   /  AST  37  /  ALT  43  /  AlkPhos  96  03-    PT/INR - ( 26 Mar 2019 07:06 )   PT: 13.0 sec;   INR: 1.14 ratio         PTT - ( 26 Mar 2019 07:06 )  PTT:29.3 sec  Urinalysis Basic - ( 26 Mar 2019 07:06 )    Color: Light Yellow / Appearance: Clear / S.005 / pH: x  Gluc: x / Ketone: Negative  / Bili: Negative / Urobili: Negative   Blood: x / Protein: Negative / Nitrite: Positive   Leuk Esterase: Negative / RBC: 2 /hpf / WBC 2 /HPF   Sq Epi: x / Non Sq Epi: 1 /hpf / Bacteria: Many        RADIOLOGY & ADDITIONAL TESTS:  < from: CT Abdomen and Pelvis w/ Oral Cont and w/ IV Cont (19 @ 15:10) >    EXAM:  CT ABDOMEN AND PELVIS OC IC                            PROCEDURE DATE:  2019            INTERPRETATION:  CLINICAL INFORMATION: Nausea vomiting and abdominal   pain.         COMPARISON: None.    PROCEDURE:   CT of the Abdomen and Pelviswas performed with intravenous contrast.   Intravenous contrast: 90 ml Omnipaque 350. 10 ml discarded.  Oral contrast: positive contrast was administered.  Sagittal and coronal reformats were performed.    FINDINGS:    LOWER CHEST: There is a 0.9 cm right lower lobe nodule (2:4) and   additional 0.8 cm nodule (2:8). Bilateral lower lobe subsegmental   atelectasis.    LIVER: Within normal limits.  BILE DUCTS: Normal caliber.  GALLBLADDER: Cholecystectomy.  SPLEEN: Within normal limits.  PANCREAS: Within normal limits.  ADRENALS: Within normal limits.  KIDNEYS/URETERS: Small right renal cyst. No hydronephrosis.    BLADDER: Within normal limits.  REPRODUCTIVE ORGANS: Hysterectomy; no adnexal masses.    BOWEL: No bowel obstruction. Appendix is normal.  PERITONEUM: No ascites.  VESSELS:  Within normal limits.  RETROPERITONEUM: No lymphadenopathy.    ABDOMINAL WALL: Fat-containing supraumbilical abdominal wall hernia   (series 2 image 81).  BONES: Multilevel degenerative changes of the spine. Hemisacralization of   the L5 vertebral body.    IMPRESSION:     No bowel obstruction.    Two right lower lobe lung nodules measuring up to 0.9 cm. Consider   dedicated CT chest for further evaluation and/or comparison with prior   imaging if available. A CT chest in 3 months is recommended to assess for   stability/resolution.                     ZI MYERS M.D., RADIOLOGY RESIDENT  This document has been electronically signed.  KESHA CARDENAS M.D., ATTENDING RADIOLOGIST  This document has beenelectronically signed. Mar 26 2019  3:54PM        < end of copied text >

## 2019-03-27 NOTE — DISCHARGE NOTE NURSING/CASE MANAGEMENT/SOCIAL WORK - NSDCDPATPORTLINK_GEN_ALL_CORE
You can access the [facility] Portal, offered by [facility], by registering with the following website: [email or web address]

## 2019-03-27 NOTE — DISCHARGE NOTE PROVIDER - CARE PROVIDER_API CALL
Harlan Manley (DO)  Gastroenterology; Internal Medicine  [address] [place] [address]  Phone: [phone]  Fax: [phone]  Follow Up Time: 2 weeks    Justin Brennan (DO)  Cardiology; Internal Medicine  [address], [address]  [place] [address]  Phone: [phone]  Fax: [phone]  Follow Up Time: 1 week

## 2019-03-27 NOTE — CONSULT NOTE ADULT - SUBJECTIVE AND OBJECTIVE BOX
Patient is a 63y old  Female who presents with a chief complaint of epigastric pain (26 Mar 2019 15:41)      HPI:    63y F PMH HTN, HLD, DM presents with epigastic pain x 3 days. Patient reports pain started as intermittent back pain, which then became epigastric and chest pain, moderate severity, non-radiating, worse when laying flat, associated with shortness of breath, nausea, vomiting x 1 episode. Has never had this pain before, thought does take a medication for her stomach that she cannot recal the name of, has never had an endoscopy. (26 Mar 2019 13:07)    ER vss.  Pt has been afebrile, no leukocytosis.  UA (+) nit/(-) LE, WBC - 2.  PT a/w chest pain, followed by cardiology and r/o for ACS.  CT abd/pelvis without bowel obstruction.  Pt currently being monitored off abx.  No urinary sx.        REVIEW OF SYSTEMS:    CONSTITUTIONAL: No fever, weight loss, or fatigue  EYES: No eye pain, visual disturbances, or discharge  ENMT:  No sore throat  NECK: No pain or stiffness  RESPIRATORY: No cough, wheezing, chills or hemoptysis; No shortness of breath  CARDIOVASCULAR: No chest pain, palpitations, dizziness, or leg swelling  GASTROINTESTINAL: No abdominal or epigastric pain. No nausea, vomiting, or hematemesis; No diarrhea or constipation. No melena or hematochezia.  GENITOURINARY: No dysuria, frequency, hematuria, or incontinence  NEUROLOGICAL: No headaches, memory loss, loss of strength, numbness, or tremors  SKIN: No itching, burning, rashes, or lesions   LYMPH NODES: No enlarged glands  MUSCULOSKELETAL: No joint pain or swelling; No muscle, back, or extremity pain      PAST MEDICAL & SURGICAL HISTORY:  DM (diabetes mellitus)  High cholesterol  HTN (hypertension)  No significant past surgical history      Allergies    No Known Allergies    Intolerances        FAMILY HISTORY:  No pertinent family history in first degree relatives      SOCIAL HISTORY:    non smoker   	no drug or alcohol abuse  lives at home    MEDICATIONS  (STANDING):  ATENolol  Tablet 100 milliGRAM(s) Oral daily  atorvastatin 10 milliGRAM(s) Oral at bedtime  dextrose 5%. 1000 milliLiter(s) (50 mL/Hr) IV Continuous <Continuous>  dextrose 50% Injectable 12.5 Gram(s) IV Push once  dextrose 50% Injectable 25 Gram(s) IV Push once  dextrose 50% Injectable 25 Gram(s) IV Push once  heparin  Injectable 5000 Unit(s) SubCutaneous every 8 hours  insulin lispro (HumaLOG) corrective regimen sliding scale   SubCutaneous three times a day before meals  pantoprazole    Tablet 40 milliGRAM(s) Oral two times a day  sucralfate 1 Gram(s) Oral four times a day    MEDICATIONS  (PRN):  dextrose 40% Gel 15 Gram(s) Oral once PRN Blood Glucose LESS THAN 70 milliGRAM(s)/deciliter  glucagon  Injectable 1 milliGRAM(s) IntraMuscular once PRN Glucose LESS THAN 70 milligrams/deciliter      Vital Signs Last 24 Hrs  T(C): 36.8 (27 Mar 2019 04:22), Max: 36.8 (26 Mar 2019 20:47)  T(F): 98.2 (27 Mar 2019 04:22), Max: 98.2 (26 Mar 2019 20:47)  HR: 81 (27 Mar 2019 04:22) (77 - 85)  BP: 137/85 (27 Mar 2019 04:22) (131/74 - 145/84)  BP(mean): --  RR: 18 (27 Mar 2019 04:22) (17 - 19)  SpO2: 93% (27 Mar 2019 04:22) (93% - 96%)    PHYSICAL EXAM:    GENERAL: NAD, well-groomed  HEAD:  Atraumatic, Normocephalic  EYES: EOMI, PERRLA, conjunctiva and sclera clear  ENMT: No tonsillar erythema, exudates, or enlargement; Moist mucous membranes  NECK: Supple, No JVD  CHEST/LUNG: Clear to percussion bilaterally; No rales, rhonchi, wheezing, or rubs  HEART: Regular rate and rhythm; No murmurs, rubs, or gallops  ABDOMEN: Soft, Nontender, Nondistended; Bowel sounds present  EXTREMITIES:  2+ Peripheral Pulses, No clubbing, cyanosis, or edema  LYMPH: No lymphadenopathy noted  SKIN: No rashes or lesions    LABS:  CBC Full  -  ( 27 Mar 2019 06:09 )  WBC Count : 7.8 K/uL  RBC Count : 4.41 M/uL  Hemoglobin : 13.6 g/dL  Hematocrit : 38.4 %  Platelet Count - Automated : 200 K/uL  Mean Cell Volume : 87.1 fl  Mean Cell Hemoglobin : 30.7 pg  Mean Cell Hemoglobin Concentration : 35.3 gm/dL  Auto Neutrophil # : x  Auto Lymphocyte # : x  Auto Monocyte # : x  Auto Eosinophil # : x  Auto Basophil # : x  Auto Neutrophil % : x  Auto Lymphocyte % : x  Auto Monocyte % : x  Auto Eosinophil % : x  Auto Basophil % : x          140  |  101  |  10  ----------------------------<  164<H>  4.0   |  25  |  0.67    Ca    9.4      27 Mar 2019 06:09    TPro  7.6  /  Alb  4.3  /  TBili  1.0  /  DBili  x   /  AST  37  /  ALT  43  /  AlkPhos  96        LIVER FUNCTIONS - ( 26 Mar 2019 05:00 )  Alb: 4.3 g/dL / Pro: 7.6 g/dL / ALK PHOS: 96 U/L / ALT: 43 U/L / AST: 37 U/L / GGT: x                               MICROBIOLOGY:        Urinalysis Basic - ( 26 Mar 2019 07:06 )    Color: Light Yellow / Appearance: Clear / S.005 / pH: x  Gluc: x / Ketone: Negative  / Bili: Negative / Urobili: Negative   Blood: x / Protein: Negative / Nitrite: Positive   Leuk Esterase: Negative / RBC: 2 /hpf / WBC 2 /HPF   Sq Epi: x / Non Sq Epi: 1 /hpf / Bacteria: Many                RADIOLOGY:    < from: CT Abdomen and Pelvis w/ Oral Cont and w/ IV Cont (19 @ 15:10) >  FINDINGS:    LOWER CHEST: There is a 0.9 cm right lower lobe nodule (2:4) and   additional 0.8 cm nodule (2:8). Bilateral lower lobe subsegmental   atelectasis.    LIVER: Within normal limits.  BILE DUCTS: Normal caliber.  GALLBLADDER: Cholecystectomy.  SPLEEN: Within normal limits.  PANCREAS: Within normal limits.  ADRENALS: Within normal limits.  KIDNEYS/URETERS: Small right renal cyst. No hydronephrosis.    BLADDER: Within normal limits.  REPRODUCTIVE ORGANS: Hysterectomy; no adnexal masses.    BOWEL: No bowel obstruction. Appendix is normal.  PERITONEUM: No ascites.  VESSELS:  Within normal limits.  RETROPERITONEUM: No lymphadenopathy.    ABDOMINAL WALL: Fat-containing supraumbilical abdominal wall hernia   (series 2 image 81).  BONES: Multilevel degenerative changes of the spine. Hemisacralization of   the L5 vertebral body.    IMPRESSION:     No bowel obstruction.    Two right lower lobe lung nodules measuring up to 0.9 cm. Consider   dedicated CT chest for further evaluation and/or comparison with prior   imaging if available. A CT chest in 3 months is recommended to assess for   stability/resolution.     < end of copied text >          < from: Xray Chest 2 Views PA/Lat (19 @ 04:03) >  FINDINGS:   The heart size is enlarged.  The lungs are clear. Trace bilateral pleural effusions.  Degenerative changes of the thoracic spine.    IMPRESSION:   Trace bilateral pleural effusions.    Cardiomegaly.    < end of copied text >  < from: Xray Chest 2 Views PA/Lat (19 @ 04:03) >  FINDINGS:   The heart size is enlarged.  The lungs are clear. Trace bilateral pleural effusions.  Degenerative changes of the thoracic spine.    IMPRESSION:   Trace bilateral pleural effusions.    Cardiomegaly.    < end of copied text > Patient is a 63y old  Female who presents with a chief complaint of epigastric pain (26 Mar 2019 15:41)      HPI:    63y F PMH HTN, HLD, DM presents with epigastic pain x 3 days. Patient reports pain started as intermittent back pain, which then became epigastric and chest pain, moderate severity, non-radiating, worse when laying flat, associated with shortness of breath, nausea, vomiting x 1 episode. Has never had this pain before, thought does take a medication for her stomach that she cannot recal the name of, has never had an endoscopy. (26 Mar 2019 13:07)    ER vss.  Pt has been afebrile, no leukocytosis.  UA (+) nit/(-) LE, WBC - 2.  PT a/w chest pain, followed by cardiology and r/o for ACS.  CT abd/pelvis without bowel obstruction.  Pt currently being monitored off abx.  No urinary sx.  Denies burning, hematuria, flank pain, f/c/r.      ID consult called for further abx managment.          REVIEW OF SYSTEMS:    CONSTITUTIONAL: No fever, weight loss, or fatigue  EYES: No eye pain, visual disturbances, or discharge  ENMT:  No sore throat  NECK: No pain or stiffness  RESPIRATORY: No cough, wheezing, chills or hemoptysis; No shortness of breath  CARDIOVASCULAR: No chest pain, palpitations, dizziness, or leg swelling  GASTROINTESTINAL: No abdominal or epigastric pain. No nausea, vomiting, or hematemesis; No diarrhea or constipation. No melena or hematochezia.  GENITOURINARY: No dysuria, frequency, hematuria, or incontinence  NEUROLOGICAL: No headaches, memory loss, loss of strength, numbness, or tremors  SKIN: No itching, burning, rashes, or lesions   LYMPH NODES: No enlarged glands  MUSCULOSKELETAL: No joint pain or swelling; No muscle, back, or extremity pain      PAST MEDICAL & SURGICAL HISTORY:  DM (diabetes mellitus)  High cholesterol  HTN (hypertension)  No significant past surgical history      Allergies    No Known Allergies    Intolerances        FAMILY HISTORY:  No pertinent family history in first degree relatives      SOCIAL HISTORY:    non smoker   	no drug or alcohol abuse  lives at home    MEDICATIONS  (STANDING):  ATENolol  Tablet 100 milliGRAM(s) Oral daily  atorvastatin 10 milliGRAM(s) Oral at bedtime  dextrose 5%. 1000 milliLiter(s) (50 mL/Hr) IV Continuous <Continuous>  dextrose 50% Injectable 12.5 Gram(s) IV Push once  dextrose 50% Injectable 25 Gram(s) IV Push once  dextrose 50% Injectable 25 Gram(s) IV Push once  heparin  Injectable 5000 Unit(s) SubCutaneous every 8 hours  insulin lispro (HumaLOG) corrective regimen sliding scale   SubCutaneous three times a day before meals  pantoprazole    Tablet 40 milliGRAM(s) Oral two times a day  sucralfate 1 Gram(s) Oral four times a day    MEDICATIONS  (PRN):  dextrose 40% Gel 15 Gram(s) Oral once PRN Blood Glucose LESS THAN 70 milliGRAM(s)/deciliter  glucagon  Injectable 1 milliGRAM(s) IntraMuscular once PRN Glucose LESS THAN 70 milligrams/deciliter      Vital Signs Last 24 Hrs  T(C): 36.8 (27 Mar 2019 04:22), Max: 36.8 (26 Mar 2019 20:47)  T(F): 98.2 (27 Mar 2019 04:22), Max: 98.2 (26 Mar 2019 20:47)  HR: 81 (27 Mar 2019 04:22) (77 - 85)  BP: 137/85 (27 Mar 2019 04:22) (131/74 - 145/84)  BP(mean): --  RR: 18 (27 Mar 2019 04:22) (17 - 19)  SpO2: 93% (27 Mar 2019 04:22) (93% - 96%)    PHYSICAL EXAM:    GENERAL: NAD, well-groomed  HEAD:  Atraumatic, Normocephalic  EYES: EOMI, PERRLA, conjunctiva and sclera clear  ENMT: No tonsillar erythema, exudates, or enlargement; Moist mucous membranes  NECK: Supple, No JVD  CHEST/LUNG: Clear to percussion bilaterally; No rales, rhonchi, wheezing, or rubs  HEART: Regular rate and rhythm; No murmurs, rubs, or gallops  ABDOMEN: Soft, Nontender, Nondistended; Bowel sounds present  EXTREMITIES:  2+ Peripheral Pulses, No clubbing, cyanosis, or edema  LYMPH: No lymphadenopathy noted  SKIN: No rashes or lesions    LABS:  CBC Full  -  ( 27 Mar 2019 06:09 )  WBC Count : 7.8 K/uL  RBC Count : 4.41 M/uL  Hemoglobin : 13.6 g/dL  Hematocrit : 38.4 %  Platelet Count - Automated : 200 K/uL  Mean Cell Volume : 87.1 fl  Mean Cell Hemoglobin : 30.7 pg  Mean Cell Hemoglobin Concentration : 35.3 gm/dL  Auto Neutrophil # : x  Auto Lymphocyte # : x  Auto Monocyte # : x  Auto Eosinophil # : x  Auto Basophil # : x  Auto Neutrophil % : x  Auto Lymphocyte % : x  Auto Monocyte % : x  Auto Eosinophil % : x  Auto Basophil % : x          140  |  101  |  10  ----------------------------<  164<H>  4.0   |  25  |  0.67    Ca    9.4      27 Mar 2019 06:09    TPro  7.6  /  Alb  4.3  /  TBili  1.0  /  DBili  x   /  AST  37  /  ALT  43  /  AlkPhos  96        LIVER FUNCTIONS - ( 26 Mar 2019 05:00 )  Alb: 4.3 g/dL / Pro: 7.6 g/dL / ALK PHOS: 96 U/L / ALT: 43 U/L / AST: 37 U/L / GGT: x                               MICROBIOLOGY:        Urinalysis Basic - ( 26 Mar 2019 07:06 )    Color: Light Yellow / Appearance: Clear / S.005 / pH: x  Gluc: x / Ketone: Negative  / Bili: Negative / Urobili: Negative   Blood: x / Protein: Negative / Nitrite: Positive   Leuk Esterase: Negative / RBC: 2 /hpf / WBC 2 /HPF   Sq Epi: x / Non Sq Epi: 1 /hpf / Bacteria: Many                RADIOLOGY:    < from: CT Abdomen and Pelvis w/ Oral Cont and w/ IV Cont (19 @ 15:10) >  FINDINGS:    LOWER CHEST: There is a 0.9 cm right lower lobe nodule (2:4) and   additional 0.8 cm nodule (2:8). Bilateral lower lobe subsegmental   atelectasis.    LIVER: Within normal limits.  BILE DUCTS: Normal caliber.  GALLBLADDER: Cholecystectomy.  SPLEEN: Within normal limits.  PANCREAS: Within normal limits.  ADRENALS: Within normal limits.  KIDNEYS/URETERS: Small right renal cyst. No hydronephrosis.    BLADDER: Within normal limits.  REPRODUCTIVE ORGANS: Hysterectomy; no adnexal masses.    BOWEL: No bowel obstruction. Appendix is normal.  PERITONEUM: No ascites.  VESSELS:  Within normal limits.  RETROPERITONEUM: No lymphadenopathy.    ABDOMINAL WALL: Fat-containing supraumbilical abdominal wall hernia   (series 2 image 81).  BONES: Multilevel degenerative changes of the spine. Hemisacralization of   the L5 vertebral body.    IMPRESSION:     No bowel obstruction.    Two right lower lobe lung nodules measuring up to 0.9 cm. Consider   dedicated CT chest for further evaluation and/or comparison with prior   imaging if available. A CT chest in 3 months is recommended to assess for   stability/resolution.     < end of copied text >          < from: Xray Chest 2 Views PA/Lat (19 @ 04:03) >  FINDINGS:   The heart size is enlarged.  The lungs are clear. Trace bilateral pleural effusions.  Degenerative changes of the thoracic spine.    IMPRESSION:   Trace bilateral pleural effusions.    Cardiomegaly.    < end of copied text >  < from: Xray Chest 2 Views PA/Lat (19 @ 04:03) >  FINDINGS:   The heart size is enlarged.  The lungs are clear. Trace bilateral pleural effusions.  Degenerative changes of the thoracic spine.    IMPRESSION:   Trace bilateral pleural effusions.    Cardiomegaly.    < end of copied text >

## 2019-03-27 NOTE — PROGRESS NOTE ADULT - SUBJECTIVE AND OBJECTIVE BOX
KARMEN MONCADA  63y Female  MRN:[id number]    Patient is a 63y old  Female who presents with a chief complaint of epigastric pain (27 Mar 2019 13:48)    HPI:  63y F PMH HTN, HLD, DM presents with epigastic pain x 3 days. Patient reports pain started as intermittent back pain, which then became epigastric and chest pain, moderate severity, non-radiating, worse when laying flat, associated with shortness of breath, nausea, vomiting x 1 episode. Has never had this pain before, thought does take a medication for her stomach that she cannot recal the name of, has never had an endoscopy. (26 Mar 2019 13:07)      Patient seen and evaluated at bedside. No acute events overnight except as noted.    Interval HPI: feeling better. tolerated diet     PAST MEDICAL & SURGICAL HISTORY:  DM (diabetes mellitus)  High cholesterol  HTN (hypertension)  No significant past surgical history      REVIEW OF SYSTEMS:  as per hpi    VITALS:  Vital Signs Last 24 Hrs  T(C): 36.7 (27 Mar 2019 14:00), Max: 36.8 (26 Mar 2019 20:47)  T(F): 98 (27 Mar 2019 14:00), Max: 98.2 (26 Mar 2019 20:47)  HR: 68 (27 Mar 2019 14:00) (64 - 85)  BP: 136/88 (27 Mar 2019 14:00) (131/74 - 145/84)  BP(mean): --  RR: 18 (27 Mar 2019 14:00) (17 - 19)  SpO2: 94% (27 Mar 2019 14:00) (93% - 96%)  CAPILLARY BLOOD GLUCOSE      POCT Blood Glucose.: 280 mg/dL (27 Mar 2019 12:42)  POCT Blood Glucose.: 181 mg/dL (27 Mar 2019 08:35)  POCT Blood Glucose.: 192 mg/dL (26 Mar 2019 22:34)  POCT Blood Glucose.: 210 mg/dL (26 Mar 2019 17:05)    I&O's Summary      PHYSICAL EXAM:  GENERAL: NAD, well-developed  HEAD:  Atraumatic, Normocephalic  EYES: EOMI, PERRLA, conjunctiva and sclera clear  NECK: Supple, No JVD  CHEST/LUNG: Clear to auscultation bilaterally; No wheeze  HEART: S1, S2; No murmurs, rubs, or gallops  ABDOMEN: Soft, Nontender, Nondistended; Bowel sounds present  EXTREMITIES:  2+ Peripheral Pulses, No clubbing, cyanosis, or edema  PSYCH: Normal affect  NEUROLOGY: AAOX3; non-focal  SKIN: No rashes or lesions    Consultant(s) Notes Reviewed:  [x ] YES  [ ] NO  Care Discussed with Consultants/Other Providers [ x] YES  [ ] NO    MEDS:  MEDICATIONS  (STANDING):  ATENolol  Tablet 100 milliGRAM(s) Oral daily  atorvastatin 10 milliGRAM(s) Oral at bedtime  dextrose 5%. 1000 milliLiter(s) (50 mL/Hr) IV Continuous <Continuous>  dextrose 50% Injectable 12.5 Gram(s) IV Push once  dextrose 50% Injectable 25 Gram(s) IV Push once  dextrose 50% Injectable 25 Gram(s) IV Push once  heparin  Injectable 5000 Unit(s) SubCutaneous every 8 hours  insulin lispro (HumaLOG) corrective regimen sliding scale   SubCutaneous three times a day before meals  pantoprazole    Tablet 40 milliGRAM(s) Oral two times a day  sucralfate 1 Gram(s) Oral four times a day    MEDICATIONS  (PRN):  dextrose 40% Gel 15 Gram(s) Oral once PRN Blood Glucose LESS THAN 70 milliGRAM(s)/deciliter  glucagon  Injectable 1 milliGRAM(s) IntraMuscular once PRN Glucose LESS THAN 70 milligrams/deciliter    ALLERGIES:  No Known Allergies      LABS:                        13.6   7.8   )-----------( 200      ( 27 Mar 2019 06:09 )             38.4     03-27    140  |  101  |  10  ----------------------------<  164<H>  4.0   |  25  |  0.67    Ca    9.4      27 Mar 2019 06:09    TPro  7.6  /  Alb  4.3  /  TBili  1.0  /  DBili  x   /  AST  37  /  ALT  43  /  AlkPhos  96  03-26    PT/INR - ( 26 Mar 2019 07:06 )   PT: 13.0 sec;   INR: 1.14 ratio         PTT - ( 26 Mar 2019 07:06 )  PTT:29.3 sec      LIVER FUNCTIONS - ( 26 Mar 2019 05:00 )  Alb: 4.3 g/dL / Pro: 7.6 g/dL / ALK PHOS: 96 U/L / ALT: 43 U/L / AST: 37 U/L / GGT: x           Urinalysis Basic - ( 26 Mar 2019 07:06 )    Color: Light Yellow / Appearance: Clear / S.005 / pH: x  Gluc: x / Ketone: Negative  / Bili: Negative / Urobili: Negative   Blood: x / Protein: Negative / Nitrite: Positive   Leuk Esterase: Negative / RBC: 2 /hpf / WBC 2 /HPF   Sq Epi: x / Non Sq Epi: 1 /hpf / Bacteria: Many      TSH:   A1c:Hemoglobin A1C, Whole Blood: 8.8 % ( @ 09:56)     < from: CT Abdomen and Pelvis w/ Oral Cont and w/ IV Cont (19 @ 15:10) >  IMPRESSION:     No bowel obstruction.    Two right lower lobe lung nodules measuring up to 0.9 cm. Consider   dedicated CT chest for further evaluation and/or comparison with prior   imaging if available. A CT chest in 3 months is recommended to assess for   stability/resolution.     < end of copied text >      < from: Transthoracic Echocardiogram (19 @ 17:35) >  -----------------------------------  Conclusions:  1. Mild concentric left ventricular hypertrophy.  2. Normal left ventricular systolic function. No segmental  wall motion abnormalities.  3. The right ventricle is not well visualized; grossly  normal right ventricular systolic function.  4. Estimated right ventricular systolic pressure equals 15  mm Hg, assuming right atrial pressure equals 8 mm Hg,  consistent with normal pulmonary pressures.  *** No previous Echo exam.  ------------------------------------------    < end of copied text >

## 2019-03-27 NOTE — PROGRESS NOTE ADULT - ASSESSMENT
62 yo female h/o htn, chol, dm, a/w atypical chest pain, epigastric abd pain, n/v    chest pain  acs ruled out  cards consulted  echo noted  outpt stress    abd pain with nausea/vomiting   ct abd/pel noted   GI consulted  refused egd  symptoms resolved   outpt f/u    dm  iss   monitor fs    +UA  no symptoms   watch off abx  id f/u    cont other home meds    dvt ppx  dc planning

## 2019-04-01 ENCOUNTER — OUTPATIENT (OUTPATIENT)
Dept: OUTPATIENT SERVICES | Facility: HOSPITAL | Age: 64
LOS: 1 days | End: 2019-04-01
Payer: MEDICAID

## 2019-04-01 PROCEDURE — G9001: CPT

## 2019-04-04 DIAGNOSIS — Z71.89 OTHER SPECIFIED COUNSELING: ICD-10-CM

## 2019-04-04 PROBLEM — I10 ESSENTIAL (PRIMARY) HYPERTENSION: Chronic | Status: ACTIVE | Noted: 2019-03-26

## 2019-04-04 PROBLEM — E11.9 TYPE 2 DIABETES MELLITUS WITHOUT COMPLICATIONS: Chronic | Status: ACTIVE | Noted: 2019-03-26

## 2019-04-04 PROBLEM — E78.00 PURE HYPERCHOLESTEROLEMIA, UNSPECIFIED: Chronic | Status: ACTIVE | Noted: 2019-03-26

## 2019-07-01 ENCOUNTER — EMERGENCY (EMERGENCY)
Facility: HOSPITAL | Age: 64
LOS: 1 days | Discharge: HOME | End: 2019-07-01
Attending: EMERGENCY MEDICINE
Payer: MEDICARE

## 2019-07-01 ENCOUNTER — OUTPATIENT (OUTPATIENT)
Dept: OUTPATIENT SERVICES | Facility: HOSPITAL | Age: 64
LOS: 1 days | End: 2019-07-01

## 2019-07-01 VITALS
HEART RATE: 66 BPM | WEIGHT: 195.11 LBS | TEMPERATURE: 98 F | OXYGEN SATURATION: 99 % | RESPIRATION RATE: 20 BRPM | DIASTOLIC BLOOD PRESSURE: 89 MMHG | HEIGHT: 66 IN | SYSTOLIC BLOOD PRESSURE: 130 MMHG

## 2019-07-01 PROCEDURE — 99283 EMERGENCY DEPT VISIT LOW MDM: CPT

## 2019-07-01 PROCEDURE — 99283 EMERGENCY DEPT VISIT LOW MDM: CPT | Mod: GC

## 2019-07-01 RX ORDER — LIDOCAINE 4 G/100G
2 CREAM TOPICAL ONCE
Refills: 0 | Status: COMPLETED | OUTPATIENT
Start: 2019-07-01 | End: 2019-07-01

## 2019-07-01 RX ADMIN — LIDOCAINE 2 PATCH: 4 CREAM TOPICAL at 16:56

## 2019-07-01 NOTE — ED PROVIDER NOTE - NSFOLLOWUPINSTRUCTIONS_ED_ALL_ED_FT
Thank you for visiting our Emergency Department, it has been a pleasure taking part in your healthcare. Please follow up with your PMD within x48 hours.    Your discharge diagnosis is: elbow pain and leg pain  Please use lidocaine patch (salonpas) on the affected area and follow-up with orthopedic.     Return precautions to the Emergency Department include but are not limited to: unrelenting nausea, vomiting, fever, chills, chest pain, shortness of breath, dizziness, abdominal pain, worsening pain, syncope, blood in urine or stool, headache that doesn't resolve, numbness or tingling, loss of sensation, loss of motor function, or any other concerning symptoms.

## 2019-07-01 NOTE — ED PROVIDER NOTE - PHYSICAL EXAMINATION
General: NAD, good hygiene, well developed  HENT: Atraumatic, EMOI, no conjunctivae injection, moist mucosa, no post. oropharynx erythema, exudates  Neck: normal ROM and trachea midline   Cardiovascular: RRR, S1&2, no M or R, radial pulses equal and b/l  Respiratory: CTABL, no wheezes or crackles, no decreased breath sounds  Abdominal: soft and non-tender non distended, neg for guarding, hannon's sign, rovsing's sign, mcburney's sign, no CVA tenderness   Extremities: ROM intact of the right elbow>90degrees w.o pain, no bony tenderness, no effusion, lesion or any change in skin or temps, neg straight leg test b/l, no pain of the calves b/l, +1 pitting edema b/l, no pain TTP of the left DP/PT equal b/l  Skin: warm, well perfused  Neurologic: nonfocal, AAOx3  Psych: normal mood and affect General: NAD, good hygiene, well developed  HENT: Atraumatic, EMOI, no conjunctivae injection, moist mucosa, no post. oropharynx erythema, exudates  Neck: normal ROM and trachea midline   Cardiovascular: RRR, S1&2, no M or R, radial pulses equal and b/l  Respiratory: CTABL, no wheezes or crackles, no decreased breath sounds  Abdominal: soft and non-tender non distended, neg for guarding, hannon's sign, rovsing's sign, mcburney's sign, no CVA tenderness   Extremities: ROM intact of the right elbow>90degrees w.o pain, no bony tenderness, no effusion, lesion or any change in skin or temps, neg straight leg test b/l, no pain of the calves b/l, +1 pitting edema b/l and equal, no pain TTP of the left DP/PT equal b/l  Skin: warm, well perfused  Neurologic: nonfocal, AAOx3  Psych: normal mood and affect

## 2019-07-01 NOTE — ED PROVIDER NOTE - CARE PLAN
Principal Discharge DX:	Elbow pain, chronic, right  Secondary Diagnosis:	Pain of left lower extremity

## 2019-07-01 NOTE — ED PROVIDER NOTE - NSFOLLOWUPCLINICS_GEN_ALL_ED_FT
[facility] Orthopedic Surgery  Orthopedic Surgery  [address], 3rd & [address]th [place] [address]  Phone: [phone]  Fax:   Follow Up Time: Routine

## 2019-07-01 NOTE — ED PROVIDER NOTE - OBJECTIVE STATEMENT
64F, h.o htn, hld, dm, p.w acute on chronic right elbow pain worse at night for 2 months.  Pt was evaluated multiple times at OSH and xray showing no fractures. Pt is able to flex her elbows and has complete motions. Pt also endorses 3 days of back pain radiating down the left leg and foot. Pain is sharp and worse with sitting. Denied fever, chills, prior symptoms, numbness/tingling, weakness of the extremities. Pt is able to ambulate. 64F, h.o htn, hld, dm, p.w acute on chronic right elbow pain worse at night for 2 months.  Pt was evaluated multiple times at OSH and xray showing no fractures. Pt is able to flex her elbows and has complete range of motion. Pt also endorses 3 days of back pain radiating down the left leg and foot. Pain is sharp and worse with sitting. Denied fever, chills, prior symptoms, numbness/tingling, weakness of the extremities. Pt is able to ambulate.  No bowel or bladder dysfunction, no sensory or motor loss in legs, no recent back surgeries or injections, no history of intravenous drug abuse, no fevers, chills or night sweats. No saddle numbness with wiping or self exam.

## 2019-07-01 NOTE — ED ADULT NURSE NOTE - NSIMPLEMENTINTERV_GEN_ALL_ED
Implemented All Universal Safety Interventions:  [place] to call system. Call bell, personal items and telephone within reach. Instruct patient to call for assistance. Room bathroom lighting operational. Non-slip footwear when patient is off stretcher. Physically safe environment: no spills, clutter or unnecessary equipment. Stretcher in lowest position, wheels locked, appropriate side rails in place.

## 2019-07-01 NOTE — ED PROVIDER NOTE - ATTENDING CONTRIBUTION TO CARE
Agree with above, Justin Wilks MD, FACEP   In this physician's medical judgement based on clinical history and physical exam, no sign of joint infection or fracture, range of motion is full, patient with arthrosis of the elbow likely and back pain consistent with sciatic or piriformis syndrome pain. Patient to take Tylenol 1g every six hours and supplement with ibuprofen 600mg (if allowed by her PMD), with food, every six hours which can be taken three hours apart from the Tylenol to have a layered effect.  Patient will  follow up with orthopedics as an outpatient.  No immediate life threatening issues present on history or clinical exam. Patient is a safe disposition home, has capacity and insight into their condition, is ambulatory in the Emergency Department with no further questions and will follow up with their doctor(s) this week. Patient understands anticipatory guidance and was given strict return and follow up precautions. The patient has been informed of all concerning signs and symptoms to return to Emergency Department, the necessity to follow up with the PMD/Clinic/follow up provided within 2-3 days was explained, and the patient reports understanding of above with capacity and insight.

## 2019-07-01 NOTE — ED PROVIDER NOTE - PROGRESS NOTE DETAILS
Jaya Saab, PGY1: pain improved and discussed with the family and patient about outpatient follow-up and they understands. WIll give lido patch for pain control

## 2019-07-01 NOTE — ED PROVIDER NOTE - NS ED ROS FT
GENERAL: No fever or chills, weight changes, nightsweats  EYES: no change in vision  HEENT: no dysplasia, odynophagia, ear pain, rhinorrhea, epistasis   CARDIAC: no chest pain, palpitation   PULMONARY: no cough or SOB  GI: no abdominal pain, no nausea or no vomiting, no diarrhea or constipation  : No changes in urination for pain/freq.   SKIN: no rashes   NEURO: no headache, numbness/tingling, extremity weakness   MSK: HPI GENERAL: No fever or chills, weight changes, nightsweats  EYES: no change in vision  HEENT: no dysplasia, odynophagia, ear pain, rhinorrhea, epistasis   CARDIAC: no chest pain, palpitation   PULMONARY: no cough or SOB  GI: no abdominal pain, no nausea or no vomiting, no diarrhea or constipation  : No changes in urination for pain/freq.   SKIN: no rashes   NEURO: no headache, numbness/tingling, extremity weakness   MSK: HPI    ROS as per HPI, attending statement, or otherwise negative.

## 2019-07-01 NOTE — ED ADULT NURSE NOTE - OBJECTIVE STATEMENT
Pt c/o right elbow pain x 2 months, constant, worse with bending.  Pt very comfortable appearing and in no apparent distress at rest or with movement of the elbow which she is able to bend freely.  There is no swelling, redness, ecchymosis or deformity.  She denies any inciting injury, has already been evaluated by MD who did XRs which were negative and advised her to take Tylenol.  She did not take Tylenol or Motrin today.  She now also c/o left "entire leg and foot pain," also without any inciting injury and which she moves without issue.  No swelling, redness, deformity, ecchymosis or wounds to leg or foot.  Denies numbness/tingling, falls, trauma or hx of this type of pain or gout.

## 2019-07-01 NOTE — ED PROVIDER NOTE - CLINICAL SUMMARY MEDICAL DECISION MAKING FREE TEXT BOX
64F, p.w acute on chronic elbow pain for 2mos and left leg pain radiating down the foot, no injury or trauma, multiple xray of the elbow at OSH showing no fx, able to ROM, no bony tenderness at the elbow, no need for xray due to unlikely fracture, able to ambulate and neg straight leg but still consistent with sciatic, will give lido patch and ortho follow-up

## 2019-07-02 DIAGNOSIS — Z71.89 OTHER SPECIFIED COUNSELING: ICD-10-CM

## 2020-04-16 ENCOUNTER — TRANSCRIPTION ENCOUNTER (OUTPATIENT)
Age: 65
End: 2020-04-16

## 2022-03-13 ENCOUNTER — TRANSCRIPTION ENCOUNTER (OUTPATIENT)
Age: 67
End: 2022-03-13

## 2022-03-16 ENCOUNTER — NON-APPOINTMENT (OUTPATIENT)
Age: 67
End: 2022-03-16

## 2022-03-16 PROBLEM — Z00.00 ENCOUNTER FOR PREVENTIVE HEALTH EXAMINATION: Status: ACTIVE | Noted: 2022-03-16

## 2022-03-17 ENCOUNTER — APPOINTMENT (OUTPATIENT)
Dept: ORTHOPEDIC SURGERY | Facility: CLINIC | Age: 67
End: 2022-03-17
Payer: MEDICARE

## 2022-03-17 VITALS — HEIGHT: 64 IN | BODY MASS INDEX: 32.44 KG/M2 | WEIGHT: 190 LBS

## 2022-03-17 DIAGNOSIS — Z86.79 PERSONAL HISTORY OF OTHER DISEASES OF THE CIRCULATORY SYSTEM: ICD-10-CM

## 2022-03-17 DIAGNOSIS — M25.561 PAIN IN RIGHT KNEE: ICD-10-CM

## 2022-03-17 DIAGNOSIS — Z78.9 OTHER SPECIFIED HEALTH STATUS: ICD-10-CM

## 2022-03-17 DIAGNOSIS — Z86.39 PERSONAL HISTORY OF OTHER ENDOCRINE, NUTRITIONAL AND METABOLIC DISEASE: ICD-10-CM

## 2022-03-17 PROCEDURE — 99202 OFFICE O/P NEW SF 15 MIN: CPT | Mod: 1L

## 2022-03-17 PROCEDURE — XXXXX: CPT

## 2022-03-17 RX ORDER — MELOXICAM 15 MG/1
15 TABLET ORAL DAILY
Qty: 30 | Refills: 1 | Status: ACTIVE | COMMUNITY
Start: 2022-03-17 | End: 1900-01-01

## 2022-03-22 ENCOUNTER — APPOINTMENT (OUTPATIENT)
Dept: ORTHOPEDIC SURGERY | Facility: CLINIC | Age: 67
End: 2022-03-22
Payer: MEDICARE

## 2022-03-22 VITALS — WEIGHT: 190 LBS | BODY MASS INDEX: 32.44 KG/M2 | HEIGHT: 64 IN

## 2022-03-22 PROCEDURE — 99204 OFFICE O/P NEW MOD 45 MIN: CPT

## 2022-03-22 RX ORDER — EMPAGLIFLOZIN 10 MG/1
10 TABLET, FILM COATED ORAL
Qty: 90 | Refills: 0 | Status: ACTIVE | COMMUNITY
Start: 2022-03-17

## 2022-03-22 RX ORDER — CELECOXIB 100 MG/1
100 CAPSULE ORAL TWICE DAILY
Qty: 60 | Refills: 2 | Status: ACTIVE | COMMUNITY
Start: 2022-03-22 | End: 1900-01-01

## 2022-03-22 RX ORDER — METFORMIN HYDROCHLORIDE 1000 MG/1
1000 TABLET, COATED ORAL
Qty: 180 | Refills: 0 | Status: ACTIVE | COMMUNITY
Start: 2022-03-17

## 2022-03-22 NOTE — PHYSICAL EXAM
[de-identified] : Patient is well nourished, well-developed, in no acute distress, with appropriate mood and affect. The patient is oriented to time, place, and person. Respirations are even and unlabored. Gait evaluation does reveal a limp. There is no inguinal adenopathy. Examination of the contralateral knee shows normal range of motion, strength, no tenderness, and intact skin. The affected limb is well-perfused, without skin lesions, shows a grossly normal motor and sensory examination. Knee motion is significantly reduced and does cause significant pain. The knee moves from 0 to 125 degrees. The knee is stable within that range-of-motion to AP and ML stress. The alignment of the knee is 5 degrees varus. Muscle strength is normal. Pedal pulses are palpable. Hip examination was negative.\par  [de-identified] : Long standing knee, AP knee, lateral knee, and patellar views of the right knee were brought in by the patient which I reviewed and demonstrate no evidence of degenerative joint disease of the knee, fractures, intra-articular pathology.

## 2022-03-22 NOTE — DISCUSSION/SUMMARY
[de-identified] : This patient has right knee pain after a twisting injury.  The patient is not an appropriate candidate for surgical intervention at this time. An extensive discussion was conducted on the natural history of the disease and the variety of surgical and non-surgical options available to the patient including, but not limited to non-steroidal anti-inflammatory medications, steroid injections, physical therapy, maintenance of ideal body weight, and reduction of activity.  Celebrex and physical therapy prescribed\par The patient will schedule an appointment as needed.\par

## 2022-04-06 ENCOUNTER — TRANSCRIPTION ENCOUNTER (OUTPATIENT)
Age: 67
End: 2022-04-06

## 2022-05-04 ENCOUNTER — APPOINTMENT (OUTPATIENT)
Dept: ORTHOPEDIC SURGERY | Facility: CLINIC | Age: 67
End: 2022-05-04
Payer: MEDICARE

## 2022-05-04 PROCEDURE — 20610 DRAIN/INJ JOINT/BURSA W/O US: CPT | Mod: RT

## 2022-05-04 PROCEDURE — 99214 OFFICE O/P EST MOD 30 MIN: CPT | Mod: 25

## 2022-05-04 NOTE — HISTORY OF PRESENT ILLNESS
[de-identified] : This is very nice 66-year-old female experiencing 6months of right knee pain, which is severe in intensity. The pain substantially limits activities of daily living. Walking tolerance is reduced.  Mobic has not helped.  Not using a knee brace.  PT x 6 weeks has not helped. Not using a cane or walker.  The patient denies any radiation of the pain to the feet and it is not associated with numbness, tingling, or weakness.

## 2022-05-04 NOTE — DISCUSSION/SUMMARY
[de-identified] : This patient has right knee pain after a twisting injury.  The patient is not an appropriate candidate for surgical intervention at this time. An extensive discussion was conducted on the natural history of the disease and the variety of surgical and non-surgical options available to the patient including, but not limited to non-steroidal anti-inflammatory medications, steroid injections, physical therapy, maintenance of ideal body weight, and reduction of activity.  Celebrex and physical therapy prescribed. I will obtain a MRI of the right knee. Also today we performed a right knee intraarticular cortisone injection. Follow up in office after MRI.\par \par Informed consent for the right knee injection was obtained. All questions were answered. A time out was performed. The right knee was prepped and draped in sterile fashion. Using sterile technique, the right knee was injected with 80mg of Kenalog, 4cc of 1% lidocaine, 4cc of 0.25% marcaine using a 21-gauge needle. A sterile dressing was applied. Post injection instructions were reviewed. The patient tolerated the procedure well.\par

## 2022-05-04 NOTE — PHYSICAL EXAM
[de-identified] : Patient is well nourished, well-developed, in no acute distress, with appropriate mood and affect. The patient is oriented to time, place, and person. Respirations are even and unlabored. Gait evaluation does reveal a limp. There is no inguinal adenopathy. Examination of the contralateral knee shows normal range of motion, strength, no tenderness, and intact skin. The affected limb is well-perfused, without skin lesions, shows a grossly normal motor and sensory examination. Knee motion is significantly reduced and does cause significant pain. The knee moves from 0 to 125 degrees. The knee is stable within that range-of-motion to AP and ML stress. The alignment of the knee is 5 degrees varus. Muscle strength is normal. Pedal pulses are palpable. Hip examination was negative.\par  [de-identified] : Long standing knee, AP knee, lateral knee, and patellar views of the right knee were brought in by the patient which I reviewed and demonstrate no evidence of degenerative joint disease of the knee, fractures, intra-articular pathology.

## 2023-01-02 PROBLEM — M25.561 RIGHT KNEE PAIN: Status: ACTIVE | Noted: 2022-03-17

## 2023-01-02 NOTE — PHYSICAL EXAM
[de-identified] : The patient is sitting comfortably in the exam room. \par Right knee\par -Skin is intact, no swelling, no ecchymosis\par -Range of motion 0-120\par -Negative Lachman, negative anterior drawer, negative posterior drawer\par -Equivocal Frida\par -Sensation is intact L1-S1\par -5/5 EHL, FHL, TA, GS, quadriceps, hamstrings\par -Foot is warm and well-perfused, palpable dorsalis pedis pulse\par  [de-identified] : X-rays of the right knee from the emergency room on 3/13/22 show no fractures.  She has tricompartmental arthritis.

## 2023-01-02 NOTE — DISCUSSION/SUMMARY
[de-identified] : 67-year-old woman with right knee arthritis\par -Mobic\par -Physical therapy\par -Follow up with our joint physicians if systems persist\par -All the patient's questions and concerns were addressed during this visit\par \par \par

## 2023-01-02 NOTE — HISTORY OF PRESENT ILLNESS
[de-identified] : Ms. KARMEN MONCADA is a 66 year woman presents today for right knee pain. On 3/11/21 she tripped and fell at a wedding.  X-rays at the emergency room revealed no fractures.  She is here for persistent pain.\par

## 2024-01-27 ENCOUNTER — NON-APPOINTMENT (OUTPATIENT)
Age: 69
End: 2024-01-27

## 2024-03-06 ENCOUNTER — NON-APPOINTMENT (OUTPATIENT)
Age: 69
End: 2024-03-06

## 2024-03-15 ENCOUNTER — NON-APPOINTMENT (OUTPATIENT)
Age: 69
End: 2024-03-15
